# Patient Record
Sex: FEMALE | Race: WHITE | Employment: OTHER | URBAN - METROPOLITAN AREA
[De-identification: names, ages, dates, MRNs, and addresses within clinical notes are randomized per-mention and may not be internally consistent; named-entity substitution may affect disease eponyms.]

---

## 2017-01-04 RX ORDER — LEVOTHYROXINE SODIUM 0.1 MG/1
TABLET ORAL
Qty: 90 TABLET | Refills: 1 | Status: SHIPPED | OUTPATIENT
Start: 2017-01-04 | End: 2017-07-03 | Stop reason: SDUPTHER

## 2017-01-31 RX ORDER — BUMETANIDE 1 MG/1
1 TABLET ORAL DAILY PRN
Qty: 30 TABLET | Refills: 5 | Status: SHIPPED | OUTPATIENT
Start: 2017-01-31 | End: 2018-08-06 | Stop reason: ALTCHOICE

## 2017-01-31 RX ORDER — ALBUTEROL SULFATE 90 UG/1
2 AEROSOL, METERED RESPIRATORY (INHALATION) EVERY 6 HOURS PRN
Qty: 1 INHALER | Refills: 11 | Status: SHIPPED | OUTPATIENT
Start: 2017-01-31 | End: 2018-01-05 | Stop reason: SDUPTHER

## 2017-07-03 RX ORDER — LEVOTHYROXINE SODIUM 0.1 MG/1
100 TABLET ORAL DAILY
Qty: 90 TABLET | Refills: 1 | Status: SHIPPED | OUTPATIENT
Start: 2017-07-03 | End: 2017-12-30 | Stop reason: SDUPTHER

## 2018-01-02 RX ORDER — LEVOTHYROXINE SODIUM 0.1 MG/1
TABLET ORAL
Qty: 90 TABLET | Refills: 1 | Status: SHIPPED | OUTPATIENT
Start: 2018-01-02 | End: 2018-07-05 | Stop reason: SDUPTHER

## 2018-01-05 ENCOUNTER — OFFICE VISIT (OUTPATIENT)
Dept: FAMILY MEDICINE CLINIC | Age: 63
End: 2018-01-05
Payer: MEDICARE

## 2018-01-05 VITALS
SYSTOLIC BLOOD PRESSURE: 130 MMHG | HEART RATE: 54 BPM | BODY MASS INDEX: 45.29 KG/M2 | DIASTOLIC BLOOD PRESSURE: 70 MMHG | WEIGHT: 287 LBS

## 2018-01-05 DIAGNOSIS — I48.0 PAROXYSMAL ATRIAL FIBRILLATION (HCC): ICD-10-CM

## 2018-01-05 DIAGNOSIS — B34.9 VIRAL SYNDROME: Primary | ICD-10-CM

## 2018-01-05 PROCEDURE — 3014F SCREEN MAMMO DOC REV: CPT | Performed by: FAMILY MEDICINE

## 2018-01-05 PROCEDURE — G8417 CALC BMI ABV UP PARAM F/U: HCPCS | Performed by: FAMILY MEDICINE

## 2018-01-05 PROCEDURE — G8484 FLU IMMUNIZE NO ADMIN: HCPCS | Performed by: FAMILY MEDICINE

## 2018-01-05 PROCEDURE — 99213 OFFICE O/P EST LOW 20 MIN: CPT | Performed by: FAMILY MEDICINE

## 2018-01-05 PROCEDURE — 3017F COLORECTAL CA SCREEN DOC REV: CPT | Performed by: FAMILY MEDICINE

## 2018-01-05 PROCEDURE — G8427 DOCREV CUR MEDS BY ELIG CLIN: HCPCS | Performed by: FAMILY MEDICINE

## 2018-01-05 PROCEDURE — 1036F TOBACCO NON-USER: CPT | Performed by: FAMILY MEDICINE

## 2018-01-05 RX ORDER — ALBUTEROL SULFATE 90 UG/1
2 AEROSOL, METERED RESPIRATORY (INHALATION) EVERY 6 HOURS PRN
Qty: 1 INHALER | Refills: 3 | Status: SHIPPED | OUTPATIENT
Start: 2018-01-05 | End: 2018-08-06 | Stop reason: ALTCHOICE

## 2018-01-05 ASSESSMENT — ENCOUNTER SYMPTOMS
SHORTNESS OF BREATH: 0
VOMITING: 0
DIARRHEA: 0
SINUS PRESSURE: 0
SORE THROAT: 0
BACK PAIN: 0
COUGH: 0
NAUSEA: 0

## 2018-01-05 NOTE — PROGRESS NOTES
Heron Alonzo is a 57 y.o. female who presents today for her medical conditions/complaints as noted below. Heron Alonzo is c/o of Cough and Congestion  URI sx for a few days worsening cough and had episode of atrial fibrillation. HPI:     Visit Information    Have you changed or started any medications since your last visit including any over-the-counter medicines, vitamins, or herbal medicines? no   Are you having any side effects from any of your medications? -  no  Have you stopped taking any of your medications? Is so, why? -  no    Have you seen any other physician or provider since your last visit? No  Have you had any other diagnostic tests since your last visit? No  Have you been seen in the emergency room and/or had an admission to a hospital since we last saw you? No  Have you had your routine dental cleaning in the past 6 months? yes -     Have you activated your [employer] account? If not, what are your barriers? Yes     Patient Care Team:  Tylor Taveras MD as PCP - General    Medical History Review  Past Medical, Family, and Social History reviewed and  contribute to the patient presenting condition    Health Maintenance   Topic Date Due    Hepatitis C screen  1955    HIV screen  01/15/1968    Zostavax vaccine  01/15/2013    Potassium monitoring  11/02/2016    Breast cancer screen  03/18/2017    Creatinine monitoring  03/23/2017    Colon Cancer Screen FIT/FOBT  08/17/2017    Flu vaccine (1) 09/01/2017    Cervical cancer screen  12/16/2018    DTaP/Tdap/Td vaccine (2 - Td) 04/28/2020    Lipid screen  11/02/2020       Past Medical History:   Diagnosis Date    Migraine     Thyroid disease     goiter    Varicose veins       Past Surgical History:   Procedure Laterality Date    BLADDER SUSPENSION  12/2002    sling?   Done same [facility] as [facility]    BREAST BIOPSY      HYSTERECTOMY  12/2005    [facility] BSO A&P repair    THYROIDECTOMY  [id number]/[id number]    UMBILICAL HERNIA REPAIR  11/2004   

## 2018-01-18 ENCOUNTER — OFFICE VISIT (OUTPATIENT)
Dept: FAMILY MEDICINE CLINIC | Age: 63
End: 2018-01-18
Payer: MEDICARE

## 2018-01-18 VITALS
BODY MASS INDEX: 45.55 KG/M2 | DIASTOLIC BLOOD PRESSURE: 82 MMHG | HEIGHT: 67 IN | SYSTOLIC BLOOD PRESSURE: 132 MMHG | HEART RATE: 72 BPM | WEIGHT: 290.2 LBS

## 2018-01-18 DIAGNOSIS — Z12.11 SCREEN FOR COLON CANCER: ICD-10-CM

## 2018-01-18 DIAGNOSIS — Z51.81 MEDICATION MONITORING ENCOUNTER: ICD-10-CM

## 2018-01-18 DIAGNOSIS — I48.0 PAROXYSMAL ATRIAL FIBRILLATION (HCC): ICD-10-CM

## 2018-01-18 DIAGNOSIS — I50.22 CHRONIC SYSTOLIC CONGESTIVE HEART FAILURE (HCC): ICD-10-CM

## 2018-01-18 DIAGNOSIS — E03.9 HYPOTHYROIDISM, UNSPECIFIED TYPE: ICD-10-CM

## 2018-01-18 DIAGNOSIS — E78.2 MIXED HYPERLIPIDEMIA: Primary | ICD-10-CM

## 2018-01-18 PROCEDURE — 3014F SCREEN MAMMO DOC REV: CPT | Performed by: FAMILY MEDICINE

## 2018-01-18 PROCEDURE — 1036F TOBACCO NON-USER: CPT | Performed by: FAMILY MEDICINE

## 2018-01-18 PROCEDURE — 99214 OFFICE O/P EST MOD 30 MIN: CPT | Performed by: FAMILY MEDICINE

## 2018-01-18 PROCEDURE — 1123F ACP DISCUSS/DSCN MKR DOCD: CPT | Performed by: FAMILY MEDICINE

## 2018-01-18 PROCEDURE — G8427 DOCREV CUR MEDS BY ELIG CLIN: HCPCS | Performed by: FAMILY MEDICINE

## 2018-01-18 PROCEDURE — 1090F PRES/ABSN URINE INCON ASSESS: CPT | Performed by: FAMILY MEDICINE

## 2018-01-18 PROCEDURE — G8399 PT W/DXA RESULTS DOCUMENT: HCPCS | Performed by: FAMILY MEDICINE

## 2018-01-18 PROCEDURE — G8484 FLU IMMUNIZE NO ADMIN: HCPCS | Performed by: FAMILY MEDICINE

## 2018-01-18 PROCEDURE — 3017F COLORECTAL CA SCREEN DOC REV: CPT | Performed by: FAMILY MEDICINE

## 2018-01-18 PROCEDURE — G8417 CALC BMI ABV UP PARAM F/U: HCPCS | Performed by: FAMILY MEDICINE

## 2018-01-18 PROCEDURE — 4040F PNEUMOC VAC/ADMIN/RCVD: CPT | Performed by: FAMILY MEDICINE

## 2018-01-18 RX ORDER — LISINOPRIL 10 MG/1
10 TABLET ORAL DAILY
COMMUNITY
End: 2019-03-07 | Stop reason: SDUPTHER

## 2018-01-18 ASSESSMENT — ENCOUNTER SYMPTOMS
COUGH: 0
FACIAL SWELLING: 0
CHEST TIGHTNESS: 0
ABDOMINAL DISTENTION: 0
VOICE CHANGE: 0
SINUS PRESSURE: 0
COLOR CHANGE: 0
EYE DISCHARGE: 0
BLOOD IN STOOL: 0
DIARRHEA: 0
BACK PAIN: 0
ABDOMINAL PAIN: 0
EYE REDNESS: 0
RHINORRHEA: 0
WHEEZING: 0
NAUSEA: 0
EYE PAIN: 0
PHOTOPHOBIA: 0
CONSTIPATION: 0
VOMITING: 0
SHORTNESS OF BREATH: 0
SORE THROAT: 0
EYE ITCHING: 0

## 2018-01-18 ASSESSMENT — PATIENT HEALTH QUESTIONNAIRE - PHQ9
SUM OF ALL RESPONSES TO PHQ QUESTIONS 1-9: 1
SUM OF ALL RESPONSES TO PHQ9 QUESTIONS 1 & 2: 1
1. LITTLE INTEREST OR PLEASURE IN DOING THINGS: 1
2. FEELING DOWN, DEPRESSED OR HOPELESS: 0

## 2018-01-18 NOTE — PROGRESS NOTES
Subjective:      Patient ID: Krista Georges is a 70 y.o. female. HPI  Here for annual check up and has been generally healthy aside from some some intermittent A-fib which frequently is caffeine related. Review of Systems   Constitutional: Negative for activity change, appetite change, chills, diaphoresis, fatigue, fever and unexpected weight change. HENT: Negative for congestion, ear pain, facial swelling, hearing loss, postnasal drip, rhinorrhea, sinus pressure, sore throat and voice change. Eyes: Negative for photophobia, pain, discharge, redness, itching and visual disturbance. Respiratory: Negative for cough, chest tightness, shortness of breath and wheezing. Cardiovascular: Negative for chest pain and palpitations. Gastrointestinal: Negative for abdominal distention, abdominal pain, blood in stool, constipation, diarrhea, nausea and vomiting. Endocrine: Negative for cold intolerance and heat intolerance. Genitourinary: Negative for decreased urine volume, difficulty urinating, dysuria, flank pain, frequency, hematuria, pelvic pain, urgency, vaginal bleeding and vaginal discharge. Musculoskeletal: Negative for arthralgias, back pain, gait problem, joint swelling, myalgias, neck pain and neck stiffness. Skin: Negative for color change, pallor and rash. Allergic/Immunologic: Negative for environmental allergies and food allergies. Neurological: Negative for dizziness, tremors, seizures, syncope, facial asymmetry, speech difficulty, weakness, numbness and headaches. Psychiatric/Behavioral: Negative for agitation, behavioral problems, dysphoric mood and sleep disturbance. The patient is not nervous/anxious and is not hyperactive. Objective:   Physical Exam   Constitutional: She is oriented to person, place, and time. She appears well-developed and well-nourished. She does not appear ill. No distress. HENT:   Head: Normocephalic and atraumatic.    Right Ear: External ear normal.   Left Ear: External ear normal.   Nose: Nose normal. No mucosal edema or rhinorrhea. Mouth/Throat: Mucous membranes are normal. No oropharyngeal exudate, posterior oropharyngeal edema or posterior oropharyngeal erythema. Eyes: EOM are normal. Pupils are equal, round, and reactive to light. Right eye exhibits no discharge. Left eye exhibits no discharge. No scleral icterus. Neck: Trachea normal and normal range of motion. Neck supple. No JVD present. Carotid bruit is not present. No tracheal deviation present. No thyromegaly present. Cardiovascular: Normal rate, regular rhythm, S1 normal, S2 normal, normal heart sounds and normal pulses. Exam reveals no gallop, no S3, no S4, no distant heart sounds and no friction rub. No murmur heard. Pulmonary/Chest: No respiratory distress. She has no decreased breath sounds. She has no wheezes. She has no rhonchi. She has no rales. Abdominal: Soft. Normal appearance and bowel sounds are normal. There is no hepatosplenomegaly. There is no tenderness. There is no CVA tenderness. No hernia. Lymphadenopathy:     She has no cervical adenopathy. Right cervical: No superficial cervical adenopathy present. Left cervical: No superficial cervical adenopathy present. Neurological: She is alert and oriented to person, place, and time. She has normal strength. No cranial nerve deficit or sensory deficit. Coordination and gait normal.   Reflex Scores:       Brachioradialis reflexes are 2+ on the right side and 2+ on the left side. Patellar reflexes are 2+ on the right side and 2+ on the left side. Achilles reflexes are 2+ on the right side and 2+ on the left side. Skin: Skin is warm and dry. No lesion and no rash noted. She is not diaphoretic. No cyanosis. Nails show no clubbing. Psychiatric: She has a normal mood and affect.  Her speech is normal and behavior is normal. Judgment and thought content normal. Cognition and memory are normal. Assessment:          Plan:      1. Encounter for general adult medical examination with abnormal findings  - I generally recommend that people of all ages try to get 150 minutes of physical activity per week and it doesnt matter how this totals up, in other words 30 minutes 5 days per week is as good as 50 minutes 3 days a week and so on. The level of activity should be such that it is able to get your heart rate up to 100 or more, for example a brisk walk should achieve this rate. - In terms of diet, I generally recommend trying to avoid processed foods wherever possible (anything that comes in a can or a box) which can be achieved by sticking to the outside walls of the grocery store where generally you will find fresh fruits/vegetables, meats, dairy, and frozen foods.    - Try to avoid starches in the diet where possible and minimize bread, rice, potatoes, and pasta in the diet. Specifically try to avoid gluten, which even in people that dont have a ariana allergy, causes havoc in the small intestine and alters absorption of nutrients which can in turn lead to obesity.   - Try to achieve a regular sleep schedule, waking and laying down at the same time each night. Most people need 7 hours per night plus or minus 2 hours. You will know that youre getting enough because you will wake feeling refreshed and not need to sleep in to catch up on weekends. - Make sure that you dont neglect your skin, I recommend an SPF 30 or higher sun screen any time that you plan to be in the sun for more than 20 minutes, even in the winter or on cloudy days (keep in mind that UV light penetrates clouds and can cause burns even on cloudy days). - Finally, make sure that you always have something to look forward to whether this is a vacation, a special event, or just a weekend off work. Having something to look forward to helps to maintain positive focus and is good for mental health.        2. Mixed hyperlipidemia  - Discussed the role of statins in the control of lipids and encouraged a low fat, low carbohydrate diet rich in vegetables and plant matter. - Discussed the side effects of lipid lowering medications which include but are not limited to myalgia especially in the larger muscle groups such as the thighs, back, and shoulders and suggested starting a 300mg CoQ10 supplement if these become problematic or contacting the office if they are intolerable or otherwise not controlled. - Discussed the goal for lipid lowering as an LDL less than 100.      3. Hypothyroidism, unspecified type  Will recheck her TSH and follow up as needed. 4. Medication monitoring encounter  CMP ordered. 5. Screen for colon cancer  FIT test ordered. 6. Chronic systolic congestive heart failure ([facility] [address]  Will check BNP and also suggested that she discuss an ablation procedure with cardiology.

## 2018-01-18 NOTE — PROGRESS NOTES
HYPERTENSION visit     BP Readings from Last 3 Encounters:   01/18/18 132/82   01/05/18 130/70   11/04/16 138/82       LDL Calculated (mg/dL)   Date Value   05/24/2013 140     LDL Cholesterol (mg/dL)   Date Value   11/02/2015 131 (H)     HDL (mg/dL)   Date Value   11/02/2015 66     BUN (mg/dL)   Date Value   11/02/2015 15     Creatinine (mg/dL)   Date Value   03/23/2016 0.9     Glucose (mg/dL)   Date Value   11/02/2015 83              Have you changed or started any medications since your last visit including any over-the-counter medicines, vitamins, or herbal medicines? no   Have you stopped taking any of your medications? Is so, why? -  no  Are you having any side effects from any of your medications? - no    Have you seen any other physician or provider since your last visit?  no   Have you had any other diagnostic tests since your last visit?  no   Have you been seen in the emergency room and/or had an admission in a hospital since we last saw you?  no   Have you had your routine dental cleaning in the past 6 months?  yes -      Do you have an active [employer] account? If no, what is the barrier?   Yes    Patient Care Team:  Ilana Almanza MD as PCP - General    Medical History Review  Past Medical, Family, and Social History reviewed and  contribute to the patient presenting condition    Health Maintenance   Topic Date Due    Hepatitis C screen  1955    HIV screen  01/15/1968    Zostavax vaccine  01/15/2013    Potassium monitoring  11/02/2016    Breast cancer screen  03/18/2017    Creatinine monitoring  03/23/2017    Colon Cancer Screen FIT/FOBT  08/17/2017    Flu vaccine (1) 09/01/2017    Pneumococcal low/med risk (1 of 2 - PCV13) 01/15/2018    Cervical cancer screen  12/16/2018    DTaP/Tdap/Td vaccine (2 - Td) 04/28/2020    Lipid screen  11/02/2020    DEXA (modify frequency per FRAX score)  Completed

## 2018-01-20 LAB
ALBUMIN SERPL-MCNC: 3.2 G/DL
ALP BLD-CCNC: 62 U/L
ALT SERPL-CCNC: 23 U/L
ANION GAP SERPL CALCULATED.3IONS-SCNC: 5 MMOL/L
AST SERPL-CCNC: 16 U/L
B-TYPE NATRIURETIC PEPTIDE: 50.8 PG/ML
BILIRUB SERPL-MCNC: 0.5 MG/DL (ref 0.1–1.4)
BUN BLDV-MCNC: 22 MG/DL
CALCIUM SERPL-MCNC: 8.4 MG/DL
CHLORIDE BLD-SCNC: 106 MMOL/L
CHOLESTEROL, TOTAL: 205 MG/DL
CHOLESTEROL/HDL RATIO: 3.2
CO2: 31 MMOL/L
CREAT SERPL-MCNC: 0.85 MG/DL
GFR CALCULATED: >60
GLUCOSE BLD-MCNC: 89 MG/DL
HDLC SERPL-MCNC: 64 MG/DL (ref 35–70)
LDL CHOLESTEROL CALCULATED: 125 MG/DL (ref 0–160)
POTASSIUM SERPL-SCNC: 4.7 MMOL/L
SODIUM BLD-SCNC: 142 MMOL/L
TOTAL PROTEIN: 7.3
TRIGL SERPL-MCNC: 80 MG/DL
TSH SERPL DL<=0.05 MIU/L-ACNC: 2.33 UIU/ML
VLDLC SERPL CALC-MCNC: 16 MG/DL

## 2018-01-23 DIAGNOSIS — Z51.81 MEDICATION MONITORING ENCOUNTER: ICD-10-CM

## 2018-01-23 DIAGNOSIS — E78.2 MIXED HYPERLIPIDEMIA: ICD-10-CM

## 2018-01-23 DIAGNOSIS — I50.22 CHRONIC SYSTOLIC CONGESTIVE HEART FAILURE (HCC): ICD-10-CM

## 2018-01-23 DIAGNOSIS — E03.9 HYPOTHYROIDISM, UNSPECIFIED TYPE: ICD-10-CM

## 2018-01-25 ENCOUNTER — OFFICE VISIT (OUTPATIENT)
Dept: OBGYN CLINIC | Age: 63
End: 2018-01-25
Payer: MEDICARE

## 2018-01-25 VITALS
HEIGHT: 67 IN | BODY MASS INDEX: 45.52 KG/M2 | WEIGHT: 290 LBS | SYSTOLIC BLOOD PRESSURE: 130 MMHG | DIASTOLIC BLOOD PRESSURE: 86 MMHG

## 2018-01-25 DIAGNOSIS — Z98.890 H/O BENIGN BREAST BIOPSY: ICD-10-CM

## 2018-01-25 DIAGNOSIS — Z12.11 SCREEN FOR COLON CANCER: ICD-10-CM

## 2018-01-25 DIAGNOSIS — Z01.419 NORMAL GYNECOLOGIC EXAMINATION: Primary | ICD-10-CM

## 2018-01-25 DIAGNOSIS — N63.20 LEFT BREAST MASS: ICD-10-CM

## 2018-01-25 LAB
CONTROL: NORMAL
HEMOCCULT STL QL: NORMAL

## 2018-01-25 PROCEDURE — 99397 PER PM REEVAL EST PAT 65+ YR: CPT | Performed by: OBSTETRICS & GYNECOLOGY

## 2018-01-25 PROCEDURE — 82274 ASSAY TEST FOR BLOOD FECAL: CPT | Performed by: FAMILY MEDICINE

## 2018-01-25 ASSESSMENT — ENCOUNTER SYMPTOMS
BACK PAIN: 0
CONSTIPATION: 0
COUGH: 0
ABDOMINAL DISTENTION: 0
SHORTNESS OF BREATH: 0
ABDOMINAL PAIN: 0
DIARRHEA: 0

## 2018-01-25 NOTE — PROGRESS NOTES
tenderness. There is no rebound and no CVA tenderness. No hernia. Genitourinary: Vagina normal. There is no lesion on the right labia. There is no lesion on the left labia. Right adnexum displays no mass and no tenderness. Left adnexum displays no mass and no tenderness. No vaginal discharge found. Genitourinary Comments: Vaginal Cuff is intact and well supported, without any lesions. She has a large rectocele but very small cystocele. Musculoskeletal: She exhibits no edema. Lymphadenopathy:     She has no cervical adenopathy. Neurological: She is alert and oriented to person, place, and time. Skin: Skin is dry. Psychiatric: She has a normal mood and affect. Her behavior is normal.       Assessment:      Encounter Diagnoses   Name Primary?  Normal gynecologic examination Yes    Left breast mass     H/O benign breast biopsy            Plan:      1. Discussed new pap smear guidelines. 2. Screening mammogram discussed and advised yearly if within normal limits. 3. Calcium and Vitamin D dosing reviewed. 4. Colonoscopy screening reviewed. 5. Bone density testing reviewed.

## 2018-02-28 ENCOUNTER — TELEPHONE (OUTPATIENT)
Dept: FAMILY MEDICINE CLINIC | Age: 63
End: 2018-02-28

## 2018-02-28 DIAGNOSIS — I48.91 ATRIAL FIBRILLATION WITH NORMAL VENTRICULAR RATE (HCC): Primary | ICD-10-CM

## 2018-03-02 DIAGNOSIS — I48.91 ATRIAL FIBRILLATION WITH NORMAL VENTRICULAR RATE (HCC): Primary | ICD-10-CM

## 2018-03-22 ENCOUNTER — TELEPHONE (OUTPATIENT)
Dept: FAMILY MEDICINE CLINIC | Age: 63
End: 2018-03-22

## 2018-07-05 ENCOUNTER — TELEPHONE (OUTPATIENT)
Dept: FAMILY MEDICINE CLINIC | Age: 63
End: 2018-07-05

## 2018-07-05 RX ORDER — LEVOTHYROXINE SODIUM 0.1 MG/1
100 TABLET ORAL DAILY
Qty: 90 TABLET | Refills: 1 | Status: SHIPPED | OUTPATIENT
Start: 2018-07-05 | End: 2018-07-30 | Stop reason: SDUPTHER

## 2018-07-30 RX ORDER — LEVOTHYROXINE SODIUM 0.1 MG/1
100 TABLET ORAL DAILY
Qty: 90 TABLET | Refills: 3 | Status: SHIPPED | OUTPATIENT
Start: 2018-07-30 | End: 2019-06-10 | Stop reason: SDUPTHER

## 2018-08-06 ENCOUNTER — OFFICE VISIT (OUTPATIENT)
Dept: FAMILY MEDICINE CLINIC | Age: 63
End: 2018-08-06
Payer: MEDICARE

## 2018-08-06 VITALS
HEART RATE: 61 BPM | WEIGHT: 284.8 LBS | SYSTOLIC BLOOD PRESSURE: 118 MMHG | BODY MASS INDEX: 45.28 KG/M2 | OXYGEN SATURATION: 95 % | DIASTOLIC BLOOD PRESSURE: 80 MMHG

## 2018-08-06 DIAGNOSIS — M25.561 ACUTE PAIN OF RIGHT KNEE: Primary | ICD-10-CM

## 2018-08-06 DIAGNOSIS — M25.361 INSTABILITY OF RIGHT KNEE JOINT: ICD-10-CM

## 2018-08-06 PROCEDURE — 99213 OFFICE O/P EST LOW 20 MIN: CPT | Performed by: FAMILY MEDICINE

## 2018-08-06 PROCEDURE — G8427 DOCREV CUR MEDS BY ELIG CLIN: HCPCS | Performed by: FAMILY MEDICINE

## 2018-08-06 PROCEDURE — 1090F PRES/ABSN URINE INCON ASSESS: CPT | Performed by: FAMILY MEDICINE

## 2018-08-06 PROCEDURE — 3017F COLORECTAL CA SCREEN DOC REV: CPT | Performed by: FAMILY MEDICINE

## 2018-08-06 PROCEDURE — 1101F PT FALLS ASSESS-DOCD LE1/YR: CPT | Performed by: FAMILY MEDICINE

## 2018-08-06 PROCEDURE — 4040F PNEUMOC VAC/ADMIN/RCVD: CPT | Performed by: FAMILY MEDICINE

## 2018-08-06 PROCEDURE — G8417 CALC BMI ABV UP PARAM F/U: HCPCS | Performed by: FAMILY MEDICINE

## 2018-08-06 PROCEDURE — 1036F TOBACCO NON-USER: CPT | Performed by: FAMILY MEDICINE

## 2018-08-06 PROCEDURE — 1123F ACP DISCUSS/DSCN MKR DOCD: CPT | Performed by: FAMILY MEDICINE

## 2018-08-06 PROCEDURE — G8399 PT W/DXA RESULTS DOCUMENT: HCPCS | Performed by: FAMILY MEDICINE

## 2018-08-06 RX ORDER — SPIRONOLACTONE 25 MG/1
25 TABLET ORAL DAILY
COMMUNITY
End: 2019-03-07 | Stop reason: SDUPTHER

## 2018-08-06 RX ORDER — FLECAINIDE ACETATE 100 MG/1
100 TABLET ORAL 2 TIMES DAILY
COMMUNITY
End: 2021-01-25

## 2018-08-06 RX ORDER — PREDNISONE 20 MG/1
TABLET ORAL
Qty: 18 TABLET | Refills: 0 | Status: SHIPPED | OUTPATIENT
Start: 2018-08-06 | End: 2018-08-16

## 2018-08-06 ASSESSMENT — ENCOUNTER SYMPTOMS
RESPIRATORY NEGATIVE: 1
GASTROINTESTINAL NEGATIVE: 1

## 2018-08-06 NOTE — PROGRESS NOTES
Subjective:      Patient ID: Amie Messina is a 70 y.o. female. HPI  Here for evaluation of right knee pain which started after she had a fall about a month ago. When she fell she came straight down on the knee and really didn't have much pain at the time of the fall but then started having bruises about 3 days later. The pain since then has been getting progressively worse and has been developing some instability. Prior to this injury there had been no other injury to the knee. Review of Systems   Respiratory: Negative. Cardiovascular: Negative. Gastrointestinal: Negative. Musculoskeletal: Positive for arthralgias (right knee ). Objective:   Physical Exam   Cardiovascular: Normal rate, S1 normal, S2 normal, normal heart sounds and normal pulses. Pulmonary/Chest: Effort normal and breath sounds normal.   Abdominal: Normal appearance and bowel sounds are normal.   Musculoskeletal:        Right knee: She exhibits swelling and effusion. She exhibits normal range of motion, no ecchymosis, no deformity, no laceration, no erythema, normal alignment, no LCL laxity, normal patellar mobility, no bony tenderness, normal meniscus and no MCL laxity. Tenderness found. Medial joint line and MCL tenderness noted. No lateral joint line, no LCL and no patellar tendon tenderness noted. Vitals:    08/06/18 0822   BP: 118/80   Pulse: 61   SpO2: 95%   Weight: 284 lb 12.8 oz (129.2 kg)       Assessment:          Plan:      1. Acute pain of right knee  There may have been a meniscal injury but I'd like to treat conservatively with steroids and knee brace for now,  She was given Rx for hinged knee brace which I'd like her to wear for the next 10 days and if there is not at least a 50% improvement in three days, I will bring her back on day 4 for a steroid injection. - XR KNEE RIGHT (3 VIEWS); Future    2. Instability of right knee joint  Knee brace prescribed.

## 2018-08-08 ENCOUNTER — TELEPHONE (OUTPATIENT)
Dept: FAMILY MEDICINE CLINIC | Age: 63
End: 2018-08-08

## 2018-08-15 DIAGNOSIS — M25.561 ACUTE PAIN OF RIGHT KNEE: ICD-10-CM

## 2018-10-18 ENCOUNTER — HOSPITAL ENCOUNTER (OUTPATIENT)
Dept: GENERAL RADIOLOGY | Age: 63
Discharge: HOME | End: 2018-10-20
Payer: MEDICARE

## 2018-10-18 ENCOUNTER — OFFICE VISIT (OUTPATIENT)
Dept: FAMILY MEDICINE CLINIC | Age: 63
End: 2018-10-18
Payer: MEDICARE

## 2018-10-18 ENCOUNTER — HOSPITAL ENCOUNTER (OUTPATIENT)
Age: 63
Discharge: HOME | End: 2018-10-20
Payer: MEDICARE

## 2018-10-18 VITALS
SYSTOLIC BLOOD PRESSURE: 120 MMHG | DIASTOLIC BLOOD PRESSURE: 74 MMHG | BODY MASS INDEX: 44.68 KG/M2 | HEART RATE: 81 BPM | WEIGHT: 281 LBS

## 2018-10-18 DIAGNOSIS — M94.0 COSTOCHONDRITIS: Primary | ICD-10-CM

## 2018-10-18 DIAGNOSIS — M94.0 COSTOCHONDRITIS: ICD-10-CM

## 2018-10-18 DIAGNOSIS — I48.0 PAROXYSMAL ATRIAL FIBRILLATION (HCC): ICD-10-CM

## 2018-10-18 PROCEDURE — G8484 FLU IMMUNIZE NO ADMIN: HCPCS | Performed by: FAMILY MEDICINE

## 2018-10-18 PROCEDURE — 3017F COLORECTAL CA SCREEN DOC REV: CPT | Performed by: FAMILY MEDICINE

## 2018-10-18 PROCEDURE — G8399 PT W/DXA RESULTS DOCUMENT: HCPCS | Performed by: FAMILY MEDICINE

## 2018-10-18 PROCEDURE — 1101F PT FALLS ASSESS-DOCD LE1/YR: CPT | Performed by: FAMILY MEDICINE

## 2018-10-18 PROCEDURE — 1036F TOBACCO NON-USER: CPT | Performed by: FAMILY MEDICINE

## 2018-10-18 PROCEDURE — 71046 X-RAY EXAM CHEST 2 VIEWS: CPT

## 2018-10-18 PROCEDURE — G8427 DOCREV CUR MEDS BY ELIG CLIN: HCPCS | Performed by: FAMILY MEDICINE

## 2018-10-18 PROCEDURE — 99213 OFFICE O/P EST LOW 20 MIN: CPT | Performed by: FAMILY MEDICINE

## 2018-10-18 PROCEDURE — G8417 CALC BMI ABV UP PARAM F/U: HCPCS | Performed by: FAMILY MEDICINE

## 2018-10-18 PROCEDURE — 1090F PRES/ABSN URINE INCON ASSESS: CPT | Performed by: FAMILY MEDICINE

## 2018-10-18 PROCEDURE — 71100 X-RAY EXAM RIBS UNI 2 VIEWS: CPT

## 2018-10-18 PROCEDURE — 1123F ACP DISCUSS/DSCN MKR DOCD: CPT | Performed by: FAMILY MEDICINE

## 2018-10-18 PROCEDURE — 4040F PNEUMOC VAC/ADMIN/RCVD: CPT | Performed by: FAMILY MEDICINE

## 2018-10-18 RX ORDER — PREDNISONE 20 MG/1
TABLET ORAL
Qty: 20 TABLET | Refills: 0 | Status: SHIPPED | OUTPATIENT
Start: 2018-10-18 | End: 2019-01-22 | Stop reason: ALTCHOICE

## 2018-10-19 ENCOUNTER — TELEPHONE (OUTPATIENT)
Dept: FAMILY MEDICINE CLINIC | Age: 63
End: 2018-10-19

## 2018-10-19 RX ORDER — LEVOFLOXACIN 500 MG/1
500 TABLET, FILM COATED ORAL DAILY
Qty: 10 TABLET | Refills: 0 | Status: SHIPPED | OUTPATIENT
Start: 2018-10-19 | End: 2018-10-29

## 2018-10-19 ASSESSMENT — ENCOUNTER SYMPTOMS
COUGH: 0
SORE THROAT: 0
BACK PAIN: 0
VOMITING: 0
SINUS PRESSURE: 0
DIARRHEA: 0
NAUSEA: 0
SHORTNESS OF BREATH: 0

## 2018-10-19 NOTE — TELEPHONE ENCOUNTER
She does not have to worry about it being contagious to others. In terms of activity, I would generally advise activity as tolerated so if she feels short of breath or has pain, that's her body's way of saying slow down and don't do that.

## 2018-10-22 ENCOUNTER — TELEPHONE (OUTPATIENT)
Dept: FAMILY MEDICINE CLINIC | Age: 63
End: 2018-10-22

## 2018-10-22 NOTE — TELEPHONE ENCOUNTER
The levaquin increases the effective dose of flecanide. For short term use this is not a concern but if it goes more than a week it would require dose adjustment on the flecanide.

## 2018-10-22 NOTE — TELEPHONE ENCOUNTER
Patient advised. She will be on levaquin for 10 days. She also wants to know if okay to take spiratalactone?

## 2018-10-22 NOTE — TELEPHONE ENCOUNTER
Yes, the spironolactone can also be continued. On the levaquin though there was an error, a 7 day course would be adequate so she doesn't have to do the full 10 day course.

## 2018-11-05 ENCOUNTER — OFFICE VISIT (OUTPATIENT)
Dept: FAMILY MEDICINE CLINIC | Age: 63
End: 2018-11-05
Payer: MEDICARE

## 2018-11-05 VITALS — SYSTOLIC BLOOD PRESSURE: 122 MMHG | HEART RATE: 45 BPM | DIASTOLIC BLOOD PRESSURE: 70 MMHG | OXYGEN SATURATION: 97 %

## 2018-11-05 DIAGNOSIS — E66.01 MORBID OBESITY WITH BMI OF 40.0-44.9, ADULT (HCC): ICD-10-CM

## 2018-11-05 DIAGNOSIS — J18.9 PNEUMONIA OF RIGHT LOWER LOBE DUE TO INFECTIOUS ORGANISM: Primary | ICD-10-CM

## 2018-11-05 DIAGNOSIS — R92.8 ABNORMAL MAMMOGRAM: ICD-10-CM

## 2018-11-05 DIAGNOSIS — Z11.59 ENCOUNTER FOR HEPATITIS C SCREENING TEST FOR LOW RISK PATIENT: ICD-10-CM

## 2018-11-05 DIAGNOSIS — Z51.81 MEDICATION MONITORING ENCOUNTER: ICD-10-CM

## 2018-11-05 DIAGNOSIS — E78.2 MIXED HYPERLIPIDEMIA: ICD-10-CM

## 2018-11-05 DIAGNOSIS — Z12.39 SCREENING FOR BREAST CANCER: ICD-10-CM

## 2018-11-05 PROCEDURE — 1123F ACP DISCUSS/DSCN MKR DOCD: CPT | Performed by: FAMILY MEDICINE

## 2018-11-05 PROCEDURE — 1036F TOBACCO NON-USER: CPT | Performed by: FAMILY MEDICINE

## 2018-11-05 PROCEDURE — 3017F COLORECTAL CA SCREEN DOC REV: CPT | Performed by: FAMILY MEDICINE

## 2018-11-05 PROCEDURE — G8484 FLU IMMUNIZE NO ADMIN: HCPCS | Performed by: FAMILY MEDICINE

## 2018-11-05 PROCEDURE — 1090F PRES/ABSN URINE INCON ASSESS: CPT | Performed by: FAMILY MEDICINE

## 2018-11-05 PROCEDURE — G8399 PT W/DXA RESULTS DOCUMENT: HCPCS | Performed by: FAMILY MEDICINE

## 2018-11-05 PROCEDURE — 4040F PNEUMOC VAC/ADMIN/RCVD: CPT | Performed by: FAMILY MEDICINE

## 2018-11-05 PROCEDURE — G8427 DOCREV CUR MEDS BY ELIG CLIN: HCPCS | Performed by: FAMILY MEDICINE

## 2018-11-05 PROCEDURE — G8417 CALC BMI ABV UP PARAM F/U: HCPCS | Performed by: FAMILY MEDICINE

## 2018-11-05 PROCEDURE — 99213 OFFICE O/P EST LOW 20 MIN: CPT | Performed by: FAMILY MEDICINE

## 2018-11-05 PROCEDURE — 1101F PT FALLS ASSESS-DOCD LE1/YR: CPT | Performed by: FAMILY MEDICINE

## 2018-11-05 ASSESSMENT — ENCOUNTER SYMPTOMS
EYE DISCHARGE: 0
DIARRHEA: 0
CONSTIPATION: 0
NAUSEA: 0
CHEST TIGHTNESS: 0
VOMITING: 0
PHOTOPHOBIA: 0
COUGH: 1
SHORTNESS OF BREATH: 0
BACK PAIN: 0
FACIAL SWELLING: 0
WHEEZING: 0
EYE REDNESS: 0
COLOR CHANGE: 0
SORE THROAT: 0
SINUS PRESSURE: 0
EYE ITCHING: 0
VOICE CHANGE: 0
EYE PAIN: 0
ABDOMINAL PAIN: 0
RHINORRHEA: 0
ABDOMINAL DISTENTION: 0
BLOOD IN STOOL: 0

## 2018-11-05 NOTE — PROGRESS NOTES
Psychiatric: She has a normal mood and affect. Her speech is normal and behavior is normal. Judgment and thought content normal. Cognition and memory are normal.     Vitals:    11/05/18 0951 11/05/18 0953   BP: 122/70    Pulse: (!) 46 (!) 45   SpO2: 97%        Assessment / Plan:      1. Pneumonia of right lower lobe due to infectious organism ([address]  - XR CHEST STANDARD (2 VW); Future  I'd like to have her use anoro for a week to address some residual mucus in the lungs and then repeat a CXR.

## 2019-01-11 LAB
ALBUMIN SERPL-MCNC: 3.8 G/DL
ALP BLD-CCNC: 53 U/L
ALT SERPL-CCNC: 15 U/L
ANION GAP SERPL CALCULATED.3IONS-SCNC: 10 MMOL/L
ANTIBODY: NONREACTIVE
AST SERPL-CCNC: 17 U/L
BILIRUB SERPL-MCNC: 0.7 MG/DL (ref 0.1–1.4)
BUN BLDV-MCNC: 23 MG/DL
CALCIUM SERPL-MCNC: 8.7 MG/DL
CHLORIDE BLD-SCNC: 105 MMOL/L
CHOLESTEROL, TOTAL: 224 MG/DL
CHOLESTEROL/HDL RATIO: 4.6
CO2: 26 MMOL/L
CREAT SERPL-MCNC: 0.85 MG/DL
GFR CALCULATED: >60
GLUCOSE BLD-MCNC: 95 MG/DL
HDLC SERPL-MCNC: 49 MG/DL (ref 35–70)
LDL CHOLESTEROL CALCULATED: 141 MG/DL (ref 0–160)
POTASSIUM SERPL-SCNC: 4.1 MMOL/L
SODIUM BLD-SCNC: 141 MMOL/L
TOTAL PROTEIN: 7
TRIGL SERPL-MCNC: 172 MG/DL
TSH SERPL DL<=0.05 MIU/L-ACNC: 4.61 UIU/ML
VLDLC SERPL CALC-MCNC: 34 MG/DL

## 2019-01-18 DIAGNOSIS — E78.2 MIXED HYPERLIPIDEMIA: ICD-10-CM

## 2019-01-18 DIAGNOSIS — Z51.81 MEDICATION MONITORING ENCOUNTER: ICD-10-CM

## 2019-01-18 DIAGNOSIS — Z11.59 ENCOUNTER FOR HEPATITIS C SCREENING TEST FOR LOW RISK PATIENT: ICD-10-CM

## 2019-01-18 DIAGNOSIS — E03.9 HYPOTHYROIDISM, UNSPECIFIED TYPE: Primary | ICD-10-CM

## 2019-01-22 ENCOUNTER — OFFICE VISIT (OUTPATIENT)
Dept: FAMILY MEDICINE CLINIC | Age: 64
End: 2019-01-22
Payer: MEDICARE

## 2019-01-22 VITALS
HEIGHT: 67 IN | BODY MASS INDEX: 44.04 KG/M2 | HEART RATE: 51 BPM | SYSTOLIC BLOOD PRESSURE: 122 MMHG | WEIGHT: 280.6 LBS | DIASTOLIC BLOOD PRESSURE: 74 MMHG | OXYGEN SATURATION: 97 %

## 2019-01-22 DIAGNOSIS — Z00.00 ROUTINE GENERAL MEDICAL EXAMINATION AT A HEALTH CARE FACILITY: Primary | ICD-10-CM

## 2019-01-22 DIAGNOSIS — I48.0 PAROXYSMAL ATRIAL FIBRILLATION (HCC): ICD-10-CM

## 2019-01-22 DIAGNOSIS — Z23 IMMUNIZATION DUE: ICD-10-CM

## 2019-01-22 PROCEDURE — 90732 PPSV23 VACC 2 YRS+ SUBQ/IM: CPT | Performed by: FAMILY MEDICINE

## 2019-01-22 PROCEDURE — G0009 ADMIN PNEUMOCOCCAL VACCINE: HCPCS | Performed by: FAMILY MEDICINE

## 2019-01-22 PROCEDURE — 4040F PNEUMOC VAC/ADMIN/RCVD: CPT | Performed by: FAMILY MEDICINE

## 2019-01-22 PROCEDURE — G8484 FLU IMMUNIZE NO ADMIN: HCPCS | Performed by: FAMILY MEDICINE

## 2019-01-22 PROCEDURE — G0438 PPPS, INITIAL VISIT: HCPCS | Performed by: FAMILY MEDICINE

## 2019-01-22 ASSESSMENT — ENCOUNTER SYMPTOMS
DIARRHEA: 0
CHEST TIGHTNESS: 0
PHOTOPHOBIA: 0
RHINORRHEA: 0
BLOOD IN STOOL: 0
FACIAL SWELLING: 0
ABDOMINAL DISTENTION: 0
VOICE CHANGE: 0
WHEEZING: 0
VOMITING: 0
EYE PAIN: 0
COLOR CHANGE: 0
SORE THROAT: 0
EYE REDNESS: 0
EYE DISCHARGE: 0
NAUSEA: 0
EYE ITCHING: 0
COUGH: 0
CONSTIPATION: 0
BACK PAIN: 0
ABDOMINAL PAIN: 0
SINUS PRESSURE: 0
SHORTNESS OF BREATH: 0

## 2019-01-22 ASSESSMENT — PATIENT HEALTH QUESTIONNAIRE - PHQ9
SUM OF ALL RESPONSES TO PHQ9 QUESTIONS 1 & 2: 0
1. LITTLE INTEREST OR PLEASURE IN DOING THINGS: 0
SUM OF ALL RESPONSES TO PHQ QUESTIONS 1-9: 0
SUM OF ALL RESPONSES TO PHQ QUESTIONS 1-9: 0
2. FEELING DOWN, DEPRESSED OR HOPELESS: 0

## 2019-01-22 ASSESSMENT — ANXIETY QUESTIONNAIRES: GAD7 TOTAL SCORE: 0

## 2019-01-22 ASSESSMENT — LIFESTYLE VARIABLES: HOW OFTEN DO YOU HAVE A DRINK CONTAINING ALCOHOL: 0

## 2019-03-08 RX ORDER — METOPROLOL TARTRATE 50 MG/1
50 TABLET, FILM COATED ORAL 2 TIMES DAILY
Qty: 180 TABLET | Refills: 1 | Status: SHIPPED | OUTPATIENT
Start: 2019-03-08

## 2019-03-08 RX ORDER — LISINOPRIL 10 MG/1
10 TABLET ORAL DAILY
Qty: 90 TABLET | Refills: 1 | Status: SHIPPED | OUTPATIENT
Start: 2019-03-08 | End: 2019-08-12 | Stop reason: SDUPTHER

## 2019-03-08 RX ORDER — SPIRONOLACTONE 25 MG/1
25 TABLET ORAL DAILY
Qty: 90 TABLET | Refills: 1 | Status: SHIPPED | OUTPATIENT
Start: 2019-03-08 | End: 2019-08-12 | Stop reason: SDUPTHER

## 2019-03-21 ENCOUNTER — TELEPHONE (OUTPATIENT)
Dept: FAMILY MEDICINE CLINIC | Age: 64
End: 2019-03-21

## 2019-04-04 ENCOUNTER — TELEPHONE (OUTPATIENT)
Dept: FAMILY MEDICINE CLINIC | Age: 64
End: 2019-04-04

## 2019-04-04 NOTE — TELEPHONE ENCOUNTER
If she wants to make that transition then she could stop the eliquis and start a 325mg daily ASA.   She should be aware though that that change would increase her stroke risk by about 1-2% per year which is an acceptably low risk but never the less if she falls into that 1-2% then the cost of treating a stroke would eliminate about 5-10 years of cost savings between the two medications and could carry permanent devastating consequences

## 2019-04-05 NOTE — TELEPHONE ENCOUNTER
Patient advised. She says the eloquis will run her about $1000 for a year. She wonders if her a-fib is all that bad. The doctor at the [facility] told her it was not all that bad. She wonders if she needs to take the eliqius and Flecainide, if she isn't all that bad. She says she feels worse on the meds than she did with the a-fib.

## 2019-04-05 NOTE — TELEPHONE ENCOUNTER
Being on medicare there's a high probability that she qualifies for the company patient assistance program, has she applied for that?

## 2019-04-05 NOTE — TELEPHONE ENCOUNTER
There really aren't degrees of badness with A-fib, it's either something you have or you don't.   It's actually more dangerous for stroke risk when you go in and out of A-fib than being in it all the time so I'm not sure what's really meant but \"not that bad\"

## 2019-04-05 NOTE — TELEPHONE ENCOUNTER
Patient has  Decided to stay on the Eliquis.   I told her she can call and see if we have samples if she can't afford them

## 2019-06-10 RX ORDER — LEVOTHYROXINE SODIUM 0.1 MG/1
TABLET ORAL
Qty: 90 TABLET | Refills: 3 | Status: SHIPPED | OUTPATIENT
Start: 2019-06-10 | End: 2019-09-24 | Stop reason: SDUPTHER

## 2019-06-10 NOTE — TELEPHONE ENCOUNTER
Next Visit Date:  No future appointments.     Health Maintenance   Topic Date Due    Shingles Vaccine (1 of 2) 01/15/2003    Colon Cancer Screen FIT/FOBT  01/25/2019    Breast cancer screen  01/29/2019    Flu vaccine (Season Ended) 12/01/2022 (Originally 9/1/2019)    Potassium monitoring  01/11/2020    Creatinine monitoring  01/11/2020    Pneumococcal 65+ years Vaccine (2 of 2 - PCV13) 01/22/2020    DTaP/Tdap/Td vaccine (2 - Td) 04/28/2020    Lipid screen  01/11/2024    DEXA (modify frequency per FRAX score)  Completed    Hepatitis C screen  Completed       No results found for: LABA1C          ( goal A1C is < 7)   No results found for: LABMICR  LDL Cholesterol (mg/dL)   Date Value   11/02/2015 131 (H)     LDL Calculated (mg/dL)   Date Value   01/11/2019 141   01/20/2018 125       (goal LDL is <100)   AST (U/L)   Date Value   01/11/2019 17     ALT (U/L)   Date Value   01/11/2019 15     BUN (mg/dL)   Date Value   01/11/2019 23     BP Readings from Last 3 Encounters:   01/22/19 122/74   11/05/18 122/70   10/18/18 120/74          (goal 120/80)    All Future Testing planned in [facility]  Lab Frequency Next Occurrence   XR CHEST STANDARD (2 VW) Once 12/04/2018   US BREAST COMPLETE LEFT Once 01/02/2019   US BREAST COMPLETE RIGHT Once 01/02/2019   YOU DIGITAL SCREEN W CAD BILATERAL Once 01/02/2019   TSH With Reflex Ft4 Once 07/18/2019               Patient Active Problem List:     Rectocele     Paroxysmal atrial fibrillation ([facility] [address]

## 2019-08-12 RX ORDER — SPIRONOLACTONE 25 MG/1
TABLET ORAL
Qty: 90 TABLET | Refills: 1 | Status: SHIPPED | OUTPATIENT
Start: 2019-08-12 | End: 2019-09-24 | Stop reason: SDUPTHER

## 2019-08-12 RX ORDER — LISINOPRIL 10 MG/1
TABLET ORAL
Qty: 90 TABLET | Refills: 1 | Status: SHIPPED | OUTPATIENT
Start: 2019-08-12

## 2019-08-12 NOTE — TELEPHONE ENCOUNTER
Last visit: 1/22/19  Last Med refill:   Does patient have enough medication for 72 hours:     Next Visit Date:  No future appointments.     Health Maintenance   Topic Date Due    Shingles Vaccine (1 of 2) 01/15/2003    Colon Cancer Screen FIT/FOBT  01/25/2019    Breast cancer screen  01/29/2019    Flu vaccine (1) 12/01/2022 (Originally 9/1/2019)    Potassium monitoring  01/11/2020    Creatinine monitoring  01/11/2020    Annual Wellness Visit (AWV)  01/22/2020    Pneumococcal 65+ years Vaccine (2 of 2 - PCV13) 01/22/2020    DTaP/Tdap/Td vaccine (2 - Td) 04/28/2020    Lipid screen  01/11/2024    DEXA (modify frequency per FRAX score)  Completed    Hepatitis C screen  Completed       No results found for: LABA1C          ( goal A1C is < 7)   No results found for: LABMICR  LDL Cholesterol (mg/dL)   Date Value   11/02/2015 131 (H)     LDL Calculated (mg/dL)   Date Value   01/11/2019 141   01/20/2018 125       (goal LDL is <100)   AST (U/L)   Date Value   01/11/2019 17     ALT (U/L)   Date Value   01/11/2019 15     BUN (mg/dL)   Date Value   01/11/2019 23     BP Readings from Last 3 Encounters:   01/22/19 122/74   11/05/18 122/70   10/18/18 120/74          (goal 120/80)    All Future Testing planned in [facility]  Lab Frequency Next Occurrence   XR CHEST STANDARD (2 VW) Once 12/04/2018   TSH With Reflex Ft4 Once 07/18/2019               Patient Active Problem List:     Rectocele     Paroxysmal atrial fibrillation ([facility] [address]

## 2019-09-25 RX ORDER — SPIRONOLACTONE 25 MG/1
TABLET ORAL
Qty: 90 TABLET | Refills: 0 | Status: SHIPPED | OUTPATIENT
Start: 2019-09-25

## 2019-09-25 RX ORDER — LEVOTHYROXINE SODIUM 0.1 MG/1
TABLET ORAL
Qty: 90 TABLET | Refills: 0 | Status: SHIPPED | OUTPATIENT
Start: 2019-09-25

## 2019-12-26 ENCOUNTER — TELEPHONE (OUTPATIENT)
Dept: OBGYN CLINIC | Age: 64
End: 2019-12-26

## 2019-12-26 DIAGNOSIS — Z12.31 BREAST CANCER SCREENING BY MAMMOGRAM: Primary | ICD-10-CM

## 2019-12-26 DIAGNOSIS — Z80.3 FH: BREAST CANCER IN FIRST DEGREE RELATIVE WHEN <50 YEARS OLD: ICD-10-CM

## 2020-05-29 ENCOUNTER — TELEPHONE (OUTPATIENT)
Dept: OBGYN CLINIC | Age: 65
End: 2020-05-29

## 2021-01-25 ENCOUNTER — OFFICE VISIT (OUTPATIENT)
Dept: OBGYN CLINIC | Age: 66
End: 2021-01-25
Payer: MEDICARE

## 2021-01-25 VITALS
WEIGHT: 293 LBS | HEIGHT: 67 IN | BODY MASS INDEX: 45.99 KG/M2 | DIASTOLIC BLOOD PRESSURE: 78 MMHG | SYSTOLIC BLOOD PRESSURE: 124 MMHG

## 2021-01-25 DIAGNOSIS — Z01.419 ENCOUNTER FOR GYNECOLOGICAL EXAMINATION: Primary | ICD-10-CM

## 2021-01-25 DIAGNOSIS — Z12.31 ENCOUNTER FOR SCREENING MAMMOGRAM FOR BREAST CANCER: ICD-10-CM

## 2021-01-25 PROBLEM — N81.10 CYSTOCELE WITH RECTOCELE: Status: ACTIVE | Noted: 2020-07-01

## 2021-01-25 PROBLEM — N81.6 CYSTOCELE WITH RECTOCELE: Status: ACTIVE | Noted: 2020-07-01

## 2021-01-25 PROCEDURE — G8484 FLU IMMUNIZE NO ADMIN: HCPCS | Performed by: STUDENT IN AN ORGANIZED HEALTH CARE EDUCATION/TRAINING PROGRAM

## 2021-01-25 PROCEDURE — G0101 CA SCREEN;PELVIC/BREAST EXAM: HCPCS | Performed by: STUDENT IN AN ORGANIZED HEALTH CARE EDUCATION/TRAINING PROGRAM

## 2021-01-25 NOTE — PROGRESS NOTES
[facility] OB/GYN   [address]  [facility], [address]      Cassandra Ruby  2021                       Primary Care Physician: Phillip Hernandez MD    CC:   Chief Complaint   Patient presents with    Annual Exam     Prev Pap:  (HYST) WNL; Prev Tammy: 3/4/20 WNL    Vaginitis     Feels very \"dry feeling like when you're lips are cracked\", irritated. This has been going on for a while          HPI: Cassandra Ruby is a 74 y.o. female  No LMP recorded. Patient has had a hysterectomy. The patient was seen and examined. She is here for an annual visit. She is complaining of dry skin at perineum. Patient had a hysterectomy in . 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 She was offered a pessary which the patient declined. Patient is not a good surgical candidate because she has a hard time with extubation following surgery. Urogynecologist suggested coconut oil at site of perineum due to atrophy. Patient has not been compliant with this but will try to do this as she is not a good candidate for estrogen due to her risk factors. Her bowel habits are regular. She denies any bloating. She denies dysuria. She denies urinary leaking. She denies vaginal discharge. She is sexually active with single partner, contraception - status post hysterectomy.       Depression Screen: Symptoms of decreased mood absent  Symptoms of anhedonia absent  **If either question is answered in a  positive fashion then complete the PHQ9 Scoring Evaluation and make the appropriate referral**    REVIEW OF SYSTEMS:   Constitutional: negative fever, negative chills  HEENT: negative visual disturbances, negative headaches  Respiratory: negative dyspnea, negative cough  Cardiovascular: negative chest pain,  negative palpitations  Gastrointestinal: negative abdominal pain, negative RUQ pain, negative N/V, negative diarrhea, negative constipation Genitourinary: negative dysuria, negative vaginal discharge, perineal dryness  Dermatological: negative rash  Hematologic: negative bruising  Immunologic/Lymphatic: negative recent illness, negative recent sick contact  Musculoskeletal: negative back pain, negative myalgias, negative arthralgias  Neurological:  negative dizziness, negative weakness  Behavior/Psych: negative depression, negative anxiety      GYNECOLOGICAL HISTORY:  Age of Menarche: 13  Age of Menopause: 46     STD History: no past history    Pap History: Last PAP was normal; 2012. Colposcopy History: denies    Permanent Sterilization: yes - [facility] BSO  Reversible Birth Control: no  Hormone Replacement Exposure: no    OBSTETRICAL HISTORY:  OB History    Para Term  AB Living   3 3 3 0 0 3   SAB TAB Ectopic Molar Multiple Live Births   0 0 0 0 0 0      # Outcome Date GA Lbr Marquis/2nd Weight Sex Delivery Anes PTL Lv   3 Term 1974 37w0d   M       2 Term 78 37w0d   M       1 Term 16 38w0d   F           PAST MEDICAL HISTORY:   has a past medical history of A-fib ([address] Migraine, Sleep apnea, Thyroid disease, and Varicose veins. PAST SURGICAL HISTORY:   has a past surgical history that includes Breast biopsy; [place] tooth extraction; Thyroidectomy (2005); Umbilical hernia repair (2004); Hysterectomy (2005); bladder suspension (2002); and Varicose vein surgery. ALLERGIES:  is allergic to codeine and tylenol [acetaminophen]. MEDICATIONS:  Prior to Admission medications    Medication Sig Start Date End Date Taking?  Authorizing Provider   levothyroxine (SYNTHROID) 100 MCG tablet TAKE 1 TABLET DAILY 19  Yes Hilary Rausch MD   spironolactone (ALDACTONE) 25 MG tablet TAKE 1 TABLET DAILY 19  Yes Hilary Rausch MD   lisinopril (PRINIVIL;ZESTRIL) 10 MG tablet TAKE 1 TABLET DAILY 19  Yes Ascencion Villaseñor MD metoprolol tartrate (LOPRESSOR) 50 MG tablet Take 1 tablet by mouth 2 times daily 3/8/19  Yes Adrian Raymond MD   Glucosamine-Chondroit-Vit C-Mn (GLUCOSAMINE CHONDR 500 COMPLEX PO) Take 1 tablet by mouth daily   Yes Historical Provider, MD   Omega-3 Fatty Acids (FISH OIL) 1000 MG CPDR Take 1,000 mg by mouth daily   Yes Historical Provider, MD   Multiple Vitamins-Minerals (ONE-A-DAY WOMENS 50 PLUS PO) Take 1 each by mouth daily. Yes Historical Provider, MD   aspirin 81 MG tablet Take 81 mg by mouth daily. Yes Historical Provider, MD   Ascorbic Acid (VITAMIN C) 500 MG tablet Take 500 mg by mouth daily. Yes Historical Provider, MD       FAMILY HISTORY:  Family History of Breast, Ovarian, Colon or Uterine Cancer: Yes sister with Breast CA   family history includes Arthritis in her mother; Breast Cancer (age of onset: 48) in her sister; Cancer (age of onset: 68) in her father; Diabetes in her sister; Heart Disease in her maternal uncle, maternal uncle, and maternal uncle; Other in her brother; Thyroid Cancer (age of onset: 66) in her mother. SOCIAL HISTORY:   reports that she has never smoked. She has never used smokeless tobacco. She reports that she does not drink alcohol or use drugs. HEALTH MAINTENANCE:  Immunization status: up to date and documented    ROUTINE GYN HEALTH MAINTENANCE  Mammogram: March 2020 BIRADS 1. Next due 3/2021. Colonoscopy: Declines. Cologuard by PCP. Pap Smears: Last 10/2012 Neg. S/p Hyst  Family Planning: [facility] BSO  DEXA: 3/2016 Neg. Next due 2026.     VITALS:  Vitals:    01/25/21 1045   BP: 124/78   Position: Sitting   Cuff Size: Large Adult   Weight: 300 lb (136.1 kg)   Height: 5' 6.5\" (1.689 m)                                                                                                                                                                                                        PHYSICAL EXAM: General Appearance: Appears healthy. Alert; in no acute distress. Pleasant. Skin: Skin color, texture, turgor normal. No rashes or lesions. HEENT: normocephalic and atraumatic, Thyroid normal to inspection and palpation  Respiratory: Normal expansion. Clear to auscultation. No rales, rhonchi, or wheezing. Cardiovascular: normal rate, normal S1 and S2, no gallops, intact distal pulses and no carotid bruits  Breast:  (Chest): normal appearance, no masses or tenderness  Abdomen: soft, non-tender, non-distended, no right upper quadrant tenderness and no CVA tenderness  Pelvic Exam:   External genitalia: General appearance; normal, Hair distribution; normal, Lesions absent. Dryness noted at perineum between vagina and rectum. Urinary system: urethral meatus normal  Vaginal: normal mucosa, no discharge, vaginal vault prolpase stage 2. Easily reducible. Cervix: surgically absent  Adnexa: surgically absent  Uterus: surgically absent  Rectal Exam: exam declined by patient  Musculoskeletal: no gross abnormalities  Extremities: non-tender BLE and non-edematous  Psych:  oriented to time, place and person     DATA:  No results found for this visit on 21. ASSESSMENT & PLAN:    Abby Kevin is a 74 y.o. female  No LMP recorded. Patient has had a hysterectomy. Annual:   Mammogram: 2020 BIRADS 1. Next due 3/2021. Colonoscopy: Declines. Cologuard by PCP. Pap Smears: Last 10/2012 Neg. S/p Hyst   Family Planning: [facility] BSO   DEXA: 3/2016 Neg. Next due . Vaginal Vault Prolapse   - s/p Urogyn consultation who recommended Pessary. Patient declined. - Coconut oil on perineum to help with atrophy. Per notes.     Patient Active Problem List    Diagnosis Date Noted    Cystocele with rectocele 2020    Paroxysmal atrial fibrillation ([facility] [address] 10/26/2016    Essential hypertension 10/26/2016    Rectocele 2015       Return in about 1 year (around 2022) for Annual. No Patient Care Coordination Note on file. Counseling Completed:    Discussed need for repeat pap as per American Society for Colposcopy and Cervical Pathology guidelines. Discussed need for mammograms every 1 year, If >44 yo and last mammogram was negative. Discussed Calcium and Vitamin D dosing. Discussed need for colonoscopy screening as well as onset for bone density testing. Discussed birth control and barrier recommendations. Discussed STD counseling and prevention. Discussed Gardisil counseling for all patients 10-35 yo. Hereditary Breast, Ovarian, Colon and Uterine Cancer screening discussed. Tobacco & Secondary smoke risks discussed; with recommendation for cessation and avoidance. Routine health maintenance per patients PCP discussed. Diagnosis Orders   1. Encounter for gynecological examination     2.  Encounter for screening mammogram for breast cancer  YOU DIGITAL SCREEN W OR WO CAD BILATERAL        Margot Berman, [facility] [place] [id number], [address]  1/25/2021, 11:53 AM

## 2021-03-29 DIAGNOSIS — Z12.31 ENCOUNTER FOR SCREENING MAMMOGRAM FOR BREAST CANCER: ICD-10-CM

## 2023-07-03 ENCOUNTER — OFFICE VISIT (OUTPATIENT)
Dept: OBGYN CLINIC | Age: 68
End: 2023-07-03
Payer: MEDICARE

## 2023-07-03 VITALS
BODY MASS INDEX: 49.64 KG/M2 | WEIGHT: 293 LBS | DIASTOLIC BLOOD PRESSURE: 75 MMHG | HEART RATE: 61 BPM | SYSTOLIC BLOOD PRESSURE: 137 MMHG

## 2023-07-03 DIAGNOSIS — N89.8 VAGINAL IRRITATION: ICD-10-CM

## 2023-07-03 DIAGNOSIS — Z12.31 SCREENING MAMMOGRAM FOR BREAST CANCER: Primary | ICD-10-CM

## 2023-07-03 DIAGNOSIS — N81.6 CYSTOCELE WITH RECTOCELE: ICD-10-CM

## 2023-07-03 DIAGNOSIS — N81.10 CYSTOCELE WITH RECTOCELE: ICD-10-CM

## 2023-07-03 DIAGNOSIS — N99.3 VAGINAL VAULT PROLAPSE, POSTHYSTERECTOMY: ICD-10-CM

## 2023-07-03 PROCEDURE — 3078F DIAST BP <80 MM HG: CPT | Performed by: OBSTETRICS & GYNECOLOGY

## 2023-07-03 PROCEDURE — 3075F SYST BP GE 130 - 139MM HG: CPT | Performed by: OBSTETRICS & GYNECOLOGY

## 2023-07-03 PROCEDURE — G0101 CA SCREEN;PELVIC/BREAST EXAM: HCPCS | Performed by: OBSTETRICS & GYNECOLOGY

## 2023-07-03 NOTE — PROGRESS NOTES
[facility] Chari Adams  [address] [place] [address]      DATE OF VISIT:  7/3/23        History and Physical    Mitali Chou    :  1955  CHIEF COMPLAINT:    Chief Complaint   Patient presents with    Annual Exam                    HPI :   Mitali Chou is a 77 y.o. femaleGRAVIDA [address] PCP: Mg Sherwood MD    Mitali Chou returns today for her annual exam.  She is also due for her mammogram.  Last DEXA scan in 2016 WNL. She has been seen by urogynecology several years ago for vaginal vault prolapse. She was offered a pessary as she was not a good candidate for surgery. She does present today stating that she does have external vaginal irritation due to dryness. She has chronic paroxysmal A-fib and is currently on Topamax and Xarelto. She does see her cardiologist regularly. She does have SOB with exertion but denies any CP. She also has CHARMAINE and is on CPAP. She is having regular bowel movements without constipation or diarrhea. She denies any significant involuntary loss of urine. She is otherwise without any significant complaints today.  _____________________________________________________________________  Past Medical History:   Diagnosis Date    A-fib ([address])     Migraine     Sleep apnea 2017    Thyroid disease     goiter    Varicose veins                                                                    Past Surgical History:   Procedure Laterality Date    BLADDER SUSPENSION  2002    sling?   Done same time as ALEJA    BREAST BIOPSY      HYSTERECTOMY (CERVIX STATUS UNKNOWN)  2005    [facility] BSO A&P repair    THYROIDECTOMY      UMBILICAL HERNIA REPAIR  2004    VARICOSE VEIN SURGERY      x3    WISDOM TOOTH EXTRACTION       Family History   Problem Relation Age of Onset    Arthritis Mother     Thyroid Cancer Mother 66        anaplastic    Cancer Father 68        kidney    Breast Cancer Sister

## 2023-07-03 NOTE — PATIENT INSTRUCTIONS
Please get your mammogram done at your convenience. We will let you know those results. Return to the office in 2 years or as needed. Happy anniversary and happy 4th of July!